# Patient Record
Sex: FEMALE | Race: WHITE | Employment: FULL TIME | URBAN - METROPOLITAN AREA
[De-identification: names, ages, dates, MRNs, and addresses within clinical notes are randomized per-mention and may not be internally consistent; named-entity substitution may affect disease eponyms.]

---

## 2018-09-08 ENCOUNTER — HOSPITAL ENCOUNTER (EMERGENCY)
Age: 23
Discharge: HOME | End: 2018-09-08
Attending: EMERGENCY MEDICINE
Payer: MEDICAID

## 2018-09-08 VITALS
HEART RATE: 104 BPM | RESPIRATION RATE: 16 BRPM | SYSTOLIC BLOOD PRESSURE: 99 MMHG | TEMPERATURE: 97.6 F | DIASTOLIC BLOOD PRESSURE: 53 MMHG | OXYGEN SATURATION: 88 %

## 2018-09-08 DIAGNOSIS — T50.901A ACCIDENTAL DRUG OVERDOSE, INITIAL ENCOUNTER: Primary | ICD-10-CM

## 2018-09-08 PROCEDURE — 99284 EMERGENCY DEPT VISIT MOD MDM: CPT

## 2018-09-08 NOTE — DISCHARGE INSTRUCTIONS
Alcohol, Drug, or Poison Ingestion: Care Instructions  Your Care Instructions    A person can become very sick, or die, from swallowing or using alcohol, drugs, or poisons. Alcohol poisoning occurs when a person drinks a large amount of alcohol. Alcohol can stop nerve signals that control breathing. It can also stop the gag reflex that prevents choking. Alcohol poisoning is serious. It can lead to brain damage or death if it's not treated right away. Drugs can be used by accident or on purpose. They can be swallowed, inhaled, injected, or absorbed through the skin. Drugs include over-the-counter medicine (such as aspirin or acetaminophen) and prescription medicine. They also include vitamins and supplements. And they include illegal drugs such as cocaine and heroin. And poisons are all around us. They include household , cosmetics, houseplants, and garden chemicals. The doctor has checked you carefully, but problems can develop later. If you notice any problems or new symptoms, get medical treatment right away. Follow-up care is a key part of your treatment and safety. Be sure to make and go to all appointments, and call your doctor if you are having problems. It's also a good idea to know your test results and keep a list of the medicines you take. How can you care for yourself at home? Alcohol problems  · Talk to your doctor or counselor about programs that can help you stop using alcohol. · Plan ways to avoid being tempted to drink. ¨ Get rid of all alcohol in your home. ¨ Avoid places where you tend to drink. ¨ Stay away from places or events that offer alcohol. ¨ Stay away from people who drink a lot. Drug problems  · Talk to your doctor about programs that can help you stop using drugs. · Get rid of any drugs you might be tempted to misuse. · Learn how to say no when other people use drugs. · Don't spend time with people who use drugs.   Poison prevention  · Keep products in the containers they came in. Keep them with the original labels. · Be careful when you use cleaning products, paints, solvents, and pesticides. Read labels before use. Use a fan to move strong odors and fumes out of your home. · Do not mix cleaning products. Try to use nontoxic . These include vinegar, lemon juice, and baking soda. When should you call for help? Poison control centers, hospitals, or your doctor can give immediate advice in the case of a poisoning. The [facility] number is [phone]. Have the poison container with you so you can give complete information to the poison control center, such as what the poison or substance is, how much was taken and when. Do not try to make the person vomit.   Call 911 anytime you think you may need emergency care. For example, call if you or someone else:    · Has used or currently uses alcohol or drugs and is very confused or can't stay awake.     · Has passed out (lost consciousness).     · Has severe trouble breathing.     · Is having a seizure.    Call your doctor now or seek immediate medical care if you or someone else:    · Has new symptoms, or is not acting normally.    Watch closely for changes in your health, and be sure to contact your doctor if:    · You do not get better as expected.     · You need help with drug or alcohol problems.     · You have problems with depression or other mental health issues. Where can you learn more? Go to http://conrad-chip.info/. Enter G547 in the search box to learn more about \"Alcohol, Drug, or Poison Ingestion: Care Instructions. \"  Current as of: November 20, 2017  Content Version: 11.7  © 0850-0574 [employer]. Care instructions adapted under license by [employer] (which disclaims liability or warranty for this information).  If you have questions about a medical condition or this instruction, always ask your healthcare professional. [address] any warranty or liability for your use of this information.

## 2018-09-08 NOTE — ED NOTES
EMERGENCY DEPARTMENT HISTORY AND PHYSICAL EXAM    3:29 AM      Date: 9/8/2018  Patient Name: Navi Singleton    History of Presenting Illness   [facility] for resp failure         History Provided By: Patient and EMS    Chief Complaint: Resp Failure  Duration:  Minutes  Timing:  Acute  Location: na  Quality: na  Severity: Severe  Modifying Factors: relieved with Narcan   Associated Symptoms: denies any other associated signs or symptoms      Additional History (Context): Navi Singleton is a 20 y.o. female with No significant past medical history who presents with resp failure athome. [facility] for shallow, slow respirations, AMS, pinpoint pupils. Much improved after 2 mg Narcan in the field. PCP: None        Past History     Past Medical History:  No past medical history on file. Past Surgical History:  No past surgical history on file. Family History:  No family history on file. Social History:  Social History   Substance Use Topics    Smoking status: Not on file    Smokeless tobacco: Not on file    Alcohol use Not on file       Allergies:  Not on File      Review of Systems     Review of Systems  No fever, no vomiting, all other systems negative except as charted. Physical Exam     Visit Vitals    /59    Pulse 95    Temp 97.6 °F (36.4 °C)    Resp (!) 4    SpO2 100%       Physical Exam     Nursing: Assessment reviewed including Past medical, Social, Family Hx  *Constit: Well developed, well hydrated;  No Acute Distress; vitals as above  Head: Normocephalic / atraumatic  *Eyes: PERRLA, conj - clear  *Ears: nontender, external canals clear., no gross hearing deficit  Nose: no drainage, not red   Throat:   Pink, no exudate; mucous membranes moist  *Neck: Supple, trachea midline,  normal thyroid  *Chest:  nontender, atraumatic  *Resp:   No respiratory distress; CTA, no wheezes, rales, rhonchi  *CV:  RRR, normal S1, S2, no M/r/G; pulses 2+ throughout   No pitting edema  *GI: soft / non tender, nondistended, NABS; no peritoneal findings   No HSM  Back:   normal ROM, no CVA ttp  *Skin: intact, no wounds, no rashes, normal color and temp  *Neuro: CN2-12 grossly intact   Motor - 5/5 throughout  Sensation - intact to L. T   Reflexes 2+ at brachioradialis and patella bilaterally  *Psych: AA0x4; Denies SI/HI/AVH   *MSK  No clubbing or cyanosis; normal muscle strength and tone upper extremities        Diagnostic Study Results     Labs -  No results found for this or any previous visit (from the past 12 hour(s)). Radiologic Studies -   No orders to display         Medical Decision Making   I am the first provider for this patient. I reviewed the vital signs, available nursing notes, past medical history, past surgical history, family history and social history. Vital Signs-Reviewed the patient's vital signs. Pulse Oximetry Analysis -  95% on RA (Interpretation) normal    Cardiac Monitor:  Rate:  90   Rhythm:  Normal Sinus Rhythm         Records Reviewed: Nursing Notes (Time of Review: 3:29 AM)    ED Course: Progress Notes, Reevaluation, and Consults:  [facility] for resp distress, relieved with Narcan. Pt denies heroin or methadone. On questioning, endorses takign a percocet earlier today. Still sleepy here  Looks well, lungs clear, normal respirations    Will observe 2 hrs (allow narcan to wear off)      6:44 AM   pt is awake and alert, ready for home. Denies taking anything other than a single percocet. Home with precautions for return. Narcan rx    Diagnosis     Clinical Impression:   1.  Accidental drug overdose, initial encounter        Disposition: home    Follow-up Information     Follow up With Details Comments Contact Info    [facility] EMERGENCY DEPT  If symptoms worsen [address] [facility] [address]  [phone]           Patient's Medications    No medications on file     _______________________________

## 2018-09-08 NOTE — ED NOTES
I have reviewed discharge instructions with the patient. The patient verbalized understanding. Patient armband removed and shredded. Patient provided a prescription for Narcan and instructed on how to use. Patient in stable condition and transported via wheelchair by ED staff to [facility].

## 2018-09-08 NOTE — ED TRIAGE NOTES
Patient arrived via EMS with suspected drug overdose. Patient was at work when she was found unresponsive and EMS was called. Per EMS, patient was diaphoretic, shallow and irregular breathing pattern and pinpoint pupils. Patient was given 1mg Narcan nasal and 1mg Narcan IV at which point patient became responsive. Patient has a medic line 20g in her right hand. Patient states that she has a history of neuropathy and takes Gabapentin which is prescribed to her but earlier in the day around 1600, she took a percocet 7.5mg which is not prescribed to her. She took percocet because she was having pain associated with her neuropathy. Patient also stated that she took a shot of liquor earlier in the day as well. Patient states that she has a prescription for doxepin and Prozac for PTSD and gabapentin and norco for neuropathy.